# Patient Record
Sex: MALE | Race: WHITE | NOT HISPANIC OR LATINO | URBAN - METROPOLITAN AREA
[De-identification: names, ages, dates, MRNs, and addresses within clinical notes are randomized per-mention and may not be internally consistent; named-entity substitution may affect disease eponyms.]

---

## 2019-04-14 ENCOUNTER — INPATIENT (INPATIENT)
Facility: HOSPITAL | Age: 39
LOS: 0 days | Discharge: HOME | DRG: 312 | End: 2019-04-15
Attending: STUDENT IN AN ORGANIZED HEALTH CARE EDUCATION/TRAINING PROGRAM | Admitting: STUDENT IN AN ORGANIZED HEALTH CARE EDUCATION/TRAINING PROGRAM
Payer: COMMERCIAL

## 2019-04-14 VITALS
RESPIRATION RATE: 18 BRPM | OXYGEN SATURATION: 98 % | WEIGHT: 227.08 LBS | HEIGHT: 71 IN | SYSTOLIC BLOOD PRESSURE: 126 MMHG | TEMPERATURE: 98 F | DIASTOLIC BLOOD PRESSURE: 82 MMHG | HEART RATE: 77 BPM

## 2019-04-14 DIAGNOSIS — Z98.890 OTHER SPECIFIED POSTPROCEDURAL STATES: Chronic | ICD-10-CM

## 2019-04-14 LAB
BASOPHILS # BLD AUTO: 0 K/UL — SIGNIFICANT CHANGE UP (ref 0–0.2)
BASOPHILS NFR BLD AUTO: 0.4 % — SIGNIFICANT CHANGE UP (ref 0–2)
EOSINOPHIL # BLD AUTO: 0.2 K/UL — SIGNIFICANT CHANGE UP (ref 0–0.5)
EOSINOPHIL NFR BLD AUTO: 2.1 % — SIGNIFICANT CHANGE UP (ref 0–6)
HCT VFR BLD CALC: 40.2 % — SIGNIFICANT CHANGE UP (ref 39–50)
HGB BLD-MCNC: 13.2 G/DL — SIGNIFICANT CHANGE UP (ref 13–17)
LYMPHOCYTES # BLD AUTO: 2.2 K/UL — SIGNIFICANT CHANGE UP (ref 1–3.3)
LYMPHOCYTES # BLD AUTO: 24.5 % — SIGNIFICANT CHANGE UP (ref 13–44)
MCHC RBC-ENTMCNC: 30.7 PG — SIGNIFICANT CHANGE UP (ref 27–34)
MCHC RBC-ENTMCNC: 32.7 GM/DL — SIGNIFICANT CHANGE UP (ref 32–36)
MCV RBC AUTO: 93.8 FL — SIGNIFICANT CHANGE UP (ref 80–100)
MONOCYTES # BLD AUTO: 1.1 K/UL — HIGH (ref 0–0.9)
MONOCYTES NFR BLD AUTO: 12.4 % — SIGNIFICANT CHANGE UP (ref 2–14)
NEUTROPHILS # BLD AUTO: 5.6 K/UL — SIGNIFICANT CHANGE UP (ref 1.8–7.4)
NEUTROPHILS NFR BLD AUTO: 60.7 % — SIGNIFICANT CHANGE UP (ref 43–77)
PLATELET # BLD AUTO: 223 K/UL — SIGNIFICANT CHANGE UP (ref 150–400)
RBC # BLD: 4.28 M/UL — SIGNIFICANT CHANGE UP (ref 4.2–5.8)
RBC # FLD: 11.9 % — SIGNIFICANT CHANGE UP (ref 10.3–14.5)
WBC # BLD: 9.2 K/UL — SIGNIFICANT CHANGE UP (ref 3.8–10.5)
WBC # FLD AUTO: 9.2 K/UL — SIGNIFICANT CHANGE UP (ref 3.8–10.5)

## 2019-04-14 PROCEDURE — 71046 X-RAY EXAM CHEST 2 VIEWS: CPT | Mod: 26

## 2019-04-14 PROCEDURE — 93010 ELECTROCARDIOGRAM REPORT: CPT | Mod: NC

## 2019-04-14 PROCEDURE — 99285 EMERGENCY DEPT VISIT HI MDM: CPT | Mod: 25

## 2019-04-14 RX ORDER — ASPIRIN/CALCIUM CARB/MAGNESIUM 324 MG
325 TABLET ORAL ONCE
Qty: 0 | Refills: 0 | Status: COMPLETED | OUTPATIENT
Start: 2019-04-14 | End: 2019-04-14

## 2019-04-14 RX ADMIN — Medication 325 MILLIGRAM(S): at 23:38

## 2019-04-14 NOTE — ED PROVIDER NOTE - PROGRESS NOTE DETAILS
eJsi Carbone, resident MD: EP was consulted for interrogation, initial trop was 7, will obtain a repeat, will admit patient for further cardiac evaluation. discussed admission with pt and agrees to plan Jesi Carbone, resident MD: pt reported that he wanted to move to a different hospital as pt has not been moved to a hospital room yet. paged and spoke with admitting team for evaluation as pt had not yet been evaluated yet. pt has medications that he needs to take in the morning and will prescribe at this time.

## 2019-04-14 NOTE — ED PROVIDER NOTE - OBJECTIVE STATEMENT
37yo man PMH CHF EF 20% s/p AICD placement, HIV, depression presents after an episode of chest pain/palpitations with associated SOB, diaphoresis, and possible AICD shock and syncope 1 hour ago. Pt reports that he has been having a cough and runny nose for the past 2 weeks and attributed it to allergies and took more allergy medication today and felt more lethargic than usual. He felt lightheaded and went to lie in bed when he started to feel palpitations and chest pain with associated SOB and diaphoresis. He felt a sensation run down his arm and is unsure whether it was a shock delivered or not and lost consciousness. He denies falling as he was already lying down. He woke up and decided to come to the ED. He is currently asymptomatic and denies chest pain, SOB, nausea, lightheadedness, headache. He has not had fevers or chills. Most recent echo was 11/2018 and is due for another echo next month. AICD was placed 12/19/2108  Cardiologist: Dr. Gary Valente  CHF specialist: Dr. Dilia Payton  EP: Dr. Warren Ngo

## 2019-04-14 NOTE — ED PROVIDER NOTE - PHYSICAL EXAMINATION
General: well-appearing young man in no acute distress  Head: normocephalic, atraumatic  Eyes: PERRL  Mouth: moist mucous membranes  Neck: supple neck  Chest: normal rate and rhythm, normal S1 and S2, device implanted on left upper chest, no overlying rash  Respiratory: clear to auscultation bilaterally, no crackles  Abdomen: soft, nontender, nondistended  Back: no midline tenderness to palpation, no CVAT  Neuro: alert and oriented x3, speech clear, moves all extremities spontaneously  Skin: no rash  Extremities: mild LE edema bilaterally, peripheral pulses 2+ bilaterally

## 2019-04-14 NOTE — ED ADULT TRIAGE NOTE - BP NONINVASIVE DIASTOLIC (MM HG)
Detail Level: Zone If no improvement, recommend follow up appointment with MD.  Doubt that this is a rash related to his flomax, more likely xerosis+rosa. 83

## 2019-04-14 NOTE — ED PROVIDER NOTE - ATTENDING CONTRIBUTION TO CARE
I performed a history and physical exam of the patient and discussed their management with the resident. I reviewed the resident's note and agree with the documented findings and plan of care, except if noted below. My medical decision making and observations can be found be found below.    39 yo male hx of HIV, CHF with AICD/PPM presents with episode of palpitations, SOB, lightheadedness followed by syncopal episode. Denies complaints at this time. Concerned for arrythmia as cause of syncope. Will check labs, trop, cxr, interrogate PPM and admit.

## 2019-04-14 NOTE — ED PROVIDER NOTE - CLINICAL SUMMARY MEDICAL DECISION MAKING FREE TEXT BOX
37yo man PMH CHF with AICD presents with an episode of syncope after palpitation, sob, and diaphoresis with possible AICD shock. Will check ecg, trop, cxr, electrolytes, and interrogate device. Will need to admit for further cardiac workup with echo and monitoring. will continue to monitor and reassess.

## 2019-04-14 NOTE — ED PROVIDER NOTE - NS ED ROS FT
General: no fevers or chills  Head: +lightheadedness, not currently  Eyes: no vision change  ENT: no neck pain, no nasal discharge   CV: +palpitation, +chest pain, not currently  Resp: +SOB  GI: no N/V/D, no abdominal pain  : no dysuria  MSK: +left arm pain  Skin: no rash  Neuro: no focal weakness, no change in sensation

## 2019-04-15 VITALS
OXYGEN SATURATION: 99 % | DIASTOLIC BLOOD PRESSURE: 46 MMHG | HEART RATE: 69 BPM | RESPIRATION RATE: 16 BRPM | TEMPERATURE: 98 F | SYSTOLIC BLOOD PRESSURE: 105 MMHG

## 2019-04-15 DIAGNOSIS — G47.33 OBSTRUCTIVE SLEEP APNEA (ADULT) (PEDIATRIC): ICD-10-CM

## 2019-04-15 DIAGNOSIS — R00.2 PALPITATIONS: ICD-10-CM

## 2019-04-15 DIAGNOSIS — R07.9 CHEST PAIN, UNSPECIFIED: ICD-10-CM

## 2019-04-15 DIAGNOSIS — Z95.810 PRESENCE OF AUTOMATIC (IMPLANTABLE) CARDIAC DEFIBRILLATOR: Chronic | ICD-10-CM

## 2019-04-15 DIAGNOSIS — B20 HUMAN IMMUNODEFICIENCY VIRUS [HIV] DISEASE: ICD-10-CM

## 2019-04-15 DIAGNOSIS — I42.0 DILATED CARDIOMYOPATHY: ICD-10-CM

## 2019-04-15 LAB
ALBUMIN SERPL ELPH-MCNC: 4.1 G/DL — SIGNIFICANT CHANGE UP (ref 3.3–5)
ALP SERPL-CCNC: 57 U/L — SIGNIFICANT CHANGE UP (ref 40–120)
ALT FLD-CCNC: 18 U/L — SIGNIFICANT CHANGE UP (ref 10–45)
ANION GAP SERPL CALC-SCNC: 13 MMOL/L — SIGNIFICANT CHANGE UP (ref 5–17)
AST SERPL-CCNC: 20 U/L — SIGNIFICANT CHANGE UP (ref 10–40)
BILIRUB SERPL-MCNC: 0.3 MG/DL — SIGNIFICANT CHANGE UP (ref 0.2–1.2)
BUN SERPL-MCNC: 21 MG/DL — SIGNIFICANT CHANGE UP (ref 7–23)
CALCIUM SERPL-MCNC: 9.2 MG/DL — SIGNIFICANT CHANGE UP (ref 8.4–10.5)
CHLORIDE SERPL-SCNC: 103 MMOL/L — SIGNIFICANT CHANGE UP (ref 96–108)
CO2 SERPL-SCNC: 29 MMOL/L — SIGNIFICANT CHANGE UP (ref 22–31)
CREAT SERPL-MCNC: 1.12 MG/DL — SIGNIFICANT CHANGE UP (ref 0.5–1.3)
GLUCOSE SERPL-MCNC: 93 MG/DL — SIGNIFICANT CHANGE UP (ref 70–99)
MAGNESIUM SERPL-MCNC: 2.3 MG/DL — SIGNIFICANT CHANGE UP (ref 1.6–2.6)
NT-PROBNP SERPL-SCNC: 58 PG/ML — SIGNIFICANT CHANGE UP (ref 0–300)
PHOSPHATE SERPL-MCNC: 2.4 MG/DL — LOW (ref 2.5–4.5)
POTASSIUM SERPL-MCNC: 3.9 MMOL/L — SIGNIFICANT CHANGE UP (ref 3.5–5.3)
POTASSIUM SERPL-SCNC: 3.9 MMOL/L — SIGNIFICANT CHANGE UP (ref 3.5–5.3)
PROT SERPL-MCNC: 7.3 G/DL — SIGNIFICANT CHANGE UP (ref 6–8.3)
SODIUM SERPL-SCNC: 145 MMOL/L — SIGNIFICANT CHANGE UP (ref 135–145)
TROPONIN T, HIGH SENSITIVITY RESULT: 7 NG/L — SIGNIFICANT CHANGE UP (ref 0–51)
TROPONIN T, HIGH SENSITIVITY RESULT: <6 NG/L — SIGNIFICANT CHANGE UP (ref 0–51)

## 2019-04-15 PROCEDURE — 93306 TTE W/DOPPLER COMPLETE: CPT | Mod: 26

## 2019-04-15 PROCEDURE — 83735 ASSAY OF MAGNESIUM: CPT

## 2019-04-15 PROCEDURE — 83880 ASSAY OF NATRIURETIC PEPTIDE: CPT

## 2019-04-15 PROCEDURE — 85027 COMPLETE CBC AUTOMATED: CPT

## 2019-04-15 PROCEDURE — 84484 ASSAY OF TROPONIN QUANT: CPT

## 2019-04-15 PROCEDURE — 99285 EMERGENCY DEPT VISIT HI MDM: CPT

## 2019-04-15 PROCEDURE — 71046 X-RAY EXAM CHEST 2 VIEWS: CPT

## 2019-04-15 PROCEDURE — 84100 ASSAY OF PHOSPHORUS: CPT

## 2019-04-15 PROCEDURE — 99223 1ST HOSP IP/OBS HIGH 75: CPT

## 2019-04-15 PROCEDURE — 80053 COMPREHEN METABOLIC PANEL: CPT

## 2019-04-15 PROCEDURE — 93005 ELECTROCARDIOGRAM TRACING: CPT

## 2019-04-15 PROCEDURE — C8929: CPT

## 2019-04-15 RX ORDER — CARVEDILOL PHOSPHATE 80 MG/1
25 CAPSULE, EXTENDED RELEASE ORAL ONCE
Qty: 0 | Refills: 0 | Status: COMPLETED | OUTPATIENT
Start: 2019-04-15 | End: 2019-04-15

## 2019-04-15 RX ORDER — SPIRONOLACTONE 25 MG/1
25 TABLET, FILM COATED ORAL DAILY
Qty: 0 | Refills: 0 | Status: DISCONTINUED | OUTPATIENT
Start: 2019-04-15 | End: 2019-04-15

## 2019-04-15 RX ORDER — SACUBITRIL AND VALSARTAN 24; 26 MG/1; MG/1
1 TABLET, FILM COATED ORAL
Qty: 0 | Refills: 0 | Status: DISCONTINUED | OUTPATIENT
Start: 2019-04-15 | End: 2019-04-15

## 2019-04-15 RX ORDER — CARVEDILOL PHOSPHATE 80 MG/1
25 CAPSULE, EXTENDED RELEASE ORAL EVERY 12 HOURS
Qty: 0 | Refills: 0 | Status: DISCONTINUED | OUTPATIENT
Start: 2019-04-15 | End: 2019-04-15

## 2019-04-15 RX ORDER — CARVEDILOL PHOSPHATE 80 MG/1
1 CAPSULE, EXTENDED RELEASE ORAL
Qty: 0 | Refills: 0 | COMMUNITY

## 2019-04-15 RX ORDER — ESZOPICLONE 2 MG/1
1 TABLET, COATED ORAL
Qty: 0 | Refills: 0 | COMMUNITY

## 2019-04-15 RX ORDER — ACETAMINOPHEN 500 MG
650 TABLET ORAL EVERY 6 HOURS
Qty: 0 | Refills: 0 | Status: DISCONTINUED | OUTPATIENT
Start: 2019-04-15 | End: 2019-04-15

## 2019-04-15 RX ORDER — SACUBITRIL AND VALSARTAN 24; 26 MG/1; MG/1
1 TABLET, FILM COATED ORAL
Qty: 0 | Refills: 0 | COMMUNITY

## 2019-04-15 RX ORDER — SPIRONOLACTONE 25 MG/1
1 TABLET, FILM COATED ORAL
Qty: 0 | Refills: 0 | COMMUNITY

## 2019-04-15 RX ORDER — SACUBITRIL AND VALSARTAN 24; 26 MG/1; MG/1
1 TABLET, FILM COATED ORAL ONCE
Qty: 0 | Refills: 0 | Status: COMPLETED | OUTPATIENT
Start: 2019-04-15 | End: 2019-04-15

## 2019-04-15 RX ORDER — BICTEGRAVIR SODIUM, EMTRICITABINE, AND TENOFOVIR ALAFENAMIDE FUMARATE 30; 120; 15 MG/1; MG/1; MG/1
1 TABLET ORAL DAILY
Qty: 0 | Refills: 0 | Status: DISCONTINUED | OUTPATIENT
Start: 2019-04-15 | End: 2019-04-15

## 2019-04-15 RX ORDER — BICTEGRAVIR SODIUM, EMTRICITABINE, AND TENOFOVIR ALAFENAMIDE FUMARATE 30; 120; 15 MG/1; MG/1; MG/1
1 TABLET ORAL
Qty: 0 | Refills: 0 | COMMUNITY

## 2019-04-15 RX ORDER — BICTEGRAVIR SODIUM, EMTRICITABINE, AND TENOFOVIR ALAFENAMIDE FUMARATE 30; 120; 15 MG/1; MG/1; MG/1
1 TABLET ORAL ONCE
Qty: 0 | Refills: 0 | Status: COMPLETED | OUTPATIENT
Start: 2019-04-15 | End: 2019-04-15

## 2019-04-15 RX ADMIN — CARVEDILOL PHOSPHATE 25 MILLIGRAM(S): 80 CAPSULE, EXTENDED RELEASE ORAL at 06:36

## 2019-04-15 RX ADMIN — BICTEGRAVIR SODIUM, EMTRICITABINE, AND TENOFOVIR ALAFENAMIDE FUMARATE 1 TABLET(S): 30; 120; 15 TABLET ORAL at 06:36

## 2019-04-15 RX ADMIN — Medication 40 MILLIGRAM(S): at 06:35

## 2019-04-15 RX ADMIN — SACUBITRIL AND VALSARTAN 1 TABLET(S): 24; 26 TABLET, FILM COATED ORAL at 06:36

## 2019-04-15 NOTE — H&P ADULT - PROBLEM SELECTOR PLAN 1
EP interrogation of AICD.   Card eval: Dr Valente aware.   Monitor on tele.  Can consider SE from benadryl/zyrtec given introduction of new med in combination to alcohol and dehydration.

## 2019-04-15 NOTE — ED ADULT NURSE NOTE - NSIMPLEMENTINTERV_GEN_ALL_ED
Implemented All Universal Safety Interventions:  [place] to call system. Call bell, personal items and telephone within reach. Instruct patient to call for assistance. Room bathroom lighting operational. Non-slip footwear when patient is off stretcher. Physically safe environment: no spills, clutter or unnecessary equipment. Stretcher in lowest position, wheels locked, appropriate side rails in place.

## 2019-04-15 NOTE — DISCHARGE NOTE PROVIDER - NSDCCPCAREPLAN_GEN_ALL_CORE_FT
PRINCIPAL DISCHARGE DIAGNOSIS  Diagnosis: Syncope  Assessment and Plan of Treatment: you will be free of symptoms  follow up with your cardiologist, Gary Villavicencio within 1-2 weeks

## 2019-04-15 NOTE — H&P ADULT - HISTORY OF PRESENT ILLNESS
39 yo m with h/o NICM (EF 15-20%) diagnosed 8/2018 in [place], s/p AICD 12/2018 in [place], HIV, RUPA on CPAP presented with palpitations and brief episode of altered MS last night. Patient had been staying out late in the city last 2 days took some benadryl night before slept all day and woke up late last night at around 9 PM. Patient has also been suffering from seasonal allergies and took some Zyrtec as well. Patient woke up to eat something but while talking to a family member felt "distant" and foggy. Some time later felt like his heart was "racing" and had similar sensation as when he gets his device interrogated. Denies any sudden shock or chest pain sensation. Unsure if he felt like his device had gone off. Has had no previous episodes. No additional episodes since being in the hospital. Has been feeling well otherwise, exercises during the week able to run 6 miles and has lost around 16 lbs intentionally.     He was diagnosed with severe dilated CM back in Aug 2018 when he gained tremendous amount of weight and MACKEY. Unclear reason was found but was told if no improvement of hear function may need heart transplant. He follow up with his HIV specialist regularly back in [place].     Current VS: temp 36.7, 104/57, 70, 16, 100 RA

## 2019-04-15 NOTE — H&P ADULT - NSICDXPASTMEDICALHX_GEN_ALL_CORE_FT
PAST MEDICAL HISTORY:  CHF (congestive heart failure)     HIV (human immunodeficiency virus infection)     RUPA on CPAP

## 2019-04-15 NOTE — H&P ADULT - NSHPSOCIALHISTORY_GEN_ALL_CORE
Lives with family.  Current smoker. used to smoke heavily for 15 years now social smoking when going out.   denies any illicit drug use.

## 2019-04-15 NOTE — ED ADULT NURSE REASSESSMENT NOTE - NS ED NURSE REASSESS COMMENT FT1
Patient resting comfortably; VSS, sinus rhythm remains on the monitor. Patient admitted and waiting for bed assignment.

## 2019-04-15 NOTE — DISCHARGE NOTE NURSING/CASE MANAGEMENT/SOCIAL WORK - NSDCPEPT PROEDHF_GEN_ALL_CORE
Call primary care provider for follow up after discharge/Monitor weight daily/Activities as tolerated/Low salt diet/Report signs and symptoms to primary care provider

## 2019-04-15 NOTE — ED ADULT NURSE REASSESSMENT NOTE - NS ED NURSE REASSESS COMMENT FT1
Patient resting comfortably; denies chest pain or SOB at this time- VSS, 18 g IV in L AC patent and site WNL. Sinus rhythm remains on the monitor, patient admitted and waiting for bed assignment.

## 2019-04-15 NOTE — DISCHARGE NOTE PROVIDER - HOSPITAL COURSE
HPI:    37 yo m with h/o NICM (EF 15-20%) diagnosed 8/2018 in [place], s/p AICD 12/2018 in [place], diagnosed with severe dilated CM back in Aug 2018, HIV, RUPA on CPAP presented with palpitations and brief episode of altered MS last night. Patient had been staying out late in the city last 2 days took some benadryl night before slept all day and woke up late last night at around 9 PM. Patient has also been suffering from seasonal allergies and took some Zyrtec as well. Patient woke up to eat something but while talking to a family member felt "distant" and foggy. Some time later felt like his heart was "racing" and had similar sensation as when he gets his device interrogated. Denies any sudden shock or chest pain sensation. Unsure if he felt like his device had gone off. Has had no previous episodes. No additional episodes since being in the hospital. Has been feeling well otherwise, exercises during the week able to run 6 miles and has lost around 16 lbs intentionally.         follow up with his HIV specialist regularly back in [place].         EP to interrogate ICD:    1. Normal ICD function with pacing/sensing thresholds and impedances    2. Patient is not pacemaker dependent    3. Battery longevity is 10.5years    4. No VT/VF episodes. No ICD therapies delivered. No events on interrogation        Pt. to have an echo as outpt in office or at [place] where he has been followed. Patient stable for discharge planning today, case discussed with Dr. Coleman and patient's cardiologist, Gary Villavicencio. No events found upon interrogation, no evidecne of arrthymia

## 2019-04-15 NOTE — ED ADULT NURSE NOTE - OBJECTIVE STATEMENT
38 y.o M presents to the ED from home c/o chest discomfort and palpitations. Patient is awake, alert and speaking coherently. As per patient " I have a history of heart failure and I have an AICD and I think it fired a few times today; I felt like I was having heart palpitations." Patient presents A&Ox3, afebrile and ambulatory; denies headache and dizziness, denies numbness and tingling; endorses chest discomfort but denies SOB at this time, abdomen soft, nontender and nondistended, denies n/v/d. Cardiac monitoring initiated- sinus rhythm on the monitor- no pacing.

## 2019-04-15 NOTE — DISCHARGE NOTE PROVIDER - CARE PROVIDER_API CALL
Gary Valente)  Cardiology; Internal Medicine  [address] [place] [address], [address]  [place] [address]  Phone: [phone]  Fax: [phone]  Follow Up Time:

## 2019-04-15 NOTE — DISCHARGE NOTE NURSING/CASE MANAGEMENT/SOCIAL WORK - NSDCDPATPORTLINK_GEN_ALL_CORE
You can access the [facility] Patient Portal, offered by [facility], by registering with the following website: [email or web address]

## 2019-04-15 NOTE — H&P ADULT - PROBLEM SELECTOR PLAN 4
Has been intermittently compliant with CPAP at home  Can bring home equipement if not discharged in 24-48hrs.

## 2019-04-15 NOTE — DISCHARGE NOTE PROVIDER - NSDCCPTREATMENT_GEN_ALL_CORE_FT
PRINCIPAL PROCEDURE  Procedure: Interrogation, ICD  Findings and Treatment: no events on review of device

## 2019-04-15 NOTE — PROCEDURE NOTE - ADDITIONAL PROCEDURE DETAILS
1. Normal ICD function with pacing/sensing thresholds and impedances  2. Patient is not pacemaker dependent  3. Battery longevity is 10.5years  4. No VT/VF episodes. No ICD therapies delivered. No events on interrogation

## 2019-04-15 NOTE — CONSULT NOTE ADULT - SUBJECTIVE AND OBJECTIVE BOX
Patient is a 38y old  Male who presents with a chief complaint of palpitations (15 Apr 2019 09:09)      HPI: 37 yo M who presented to the [facility] ER after an episode of lightheadedness followed by LOC for 10-15 seconds while at home. Prior to the   LOC he felt palpitations- like his heart going fast and slow. He also felt like his ICD went off and was shocked. He has been suffering from allergies over the   last few weeks and took a antihistamine with a decongestant the night before. He has been compliant with his medications and diet. No recent SOB, CP or dizzy  spells. In the ER, his BP was stable and initial ECG was unremarkable. Troponin was negative and his electrolytes were normal.      PAST MEDICAL & SURGICAL HISTORY:  Dilated nonischemic CM  HFrEF  Depression  HIV (human immunodeficiency virus infection)  CHF (congestive heart failure)  H/O hernia repair      MEDICATIONS  (STANDING):  bictegravir 50 mG/emtricitabine 200 mG/tenofovir alafenamide 25 mG (BIKTARVY) 1 Tablet(s) Oral daily  carvedilol 25 milliGRAM(s) Oral every 12 hours  sacubitril 97 mG/valsartan 103 mG 1 Tablet(s) Oral two times a day  spironolactone 25 milliGRAM(s) Oral daily  torsemide 40 milliGRAM(s) Oral two times a day    MEDICATIONS  (PRN):  acetaminophen   Tablet .. 650 milliGRAM(s) Oral every 6 hours PRN Temp greater or equal to 38C (100.4F), Mild Pain (1 - 3)    ALLERGIES: NKDA    FAMILY HISTORY: Mother living- no known heart history    SOCIAL HISTORY: single; law student; social alcohol use    CIGARETTES: no tobacco use    REVIEW OF SYSTEMS  General: negative  Respiratory and Thorax: no SOB	  Cardiovascular:	see HPI  Gastrointestinal:	negative  Neurological: negative  	  Vital Signs Last 24 Hrs  T(C): 36.6 (15 Apr 2019 08:12), Max: 36.7 (15 Apr 2019 02:49)  T(F): 97.9 (15 Apr 2019 08:12), Max: 98 (15 Apr 2019 02:49)  HR: 67 (15 Apr 2019 08:12) (67 - 77)  BP: 105/47 (15 Apr 2019 08:12) (105/47 - 127/84)  BP(mean): --  RR: 16 (15 Apr 2019 08:12) (15 - 18)  SpO2: 98% (15 Apr 2019 08:12) (98% - 100%)  PHYSICAL EXAM:      Constitutional: WD, WN in NAD  Neck: no JVD  Respiratory: clear lungs  Cardiovascular: RRR with 1/6 LAURIE at base  Gastrointestinal: BS present, NT, ND, no HSM or masses  Extremities: no edema   Neurological: grossly nonfocal  Psychiatric: nl affect and disposition    TELEMETRY: RSR    ECG:< from: 12 Lead ECG (04.14.19 @ 22:27) >  Ventricular Rate 66 BPM    Atrial Rate 66 BPM    P-R Interval 176 ms    QRS Duration 98 ms    Q-T Interval 410 ms    QTC Calculation(Bezet) 429 ms    P Axis 49 degrees    R Axis 21 degrees    T Axis -39 degrees    Diagnosis Line NORMAL SINUS RHYTHM  NONSPECIFIC T WAVE ABNORMALITY  ABNORMAL ECG    Confirmed by ATTENDING, ED ([id number]),  DIEGO URENA ([id number]) on 4/15/2019 6:10:36 AM    < end of copied text >      LABS:                        13.2   9.2   )-----------( 223      ( 14 Apr 2019 23:34 )             40.2     04-14    145  |  103  |  21  ----------------------------<  93  3.9   |  29  |  1.12    Ca    9.2      14 Apr 2019 23:34  Phos  2.4     04-14  Mg     2.3     04-14    TPro  7.3  /  Alb  4.1  /  TBili  0.3  /  DBili  x   /  AST  20  /  ALT  18  /  AlkPhos  57  04-14    Troponin T- <6, 7    I&O's Summary    BNPSerum Pro-Brain Natriuretic Peptide: 58 pg/mL (04-14 @ 23:34)    RADIOLOGY & ADDITIONAL STUDIES:  < from: Xray Chest 2 Views PA/Lat (04.14.19 @ 23:40) >  EXAM:  XR CHEST PA LAT 2V                            PROCEDURE DATE:  04/14/2019            INTERPRETATION:  CLINICAL INDICATION: Chest pain.    EXAM: Frontal and lateral views of the chest with no prior radiographs.    FINDINGS:   Left-sided AICD.  The lungs are clear. There is no pleural effusion or pneumothorax.  The heart size is normal.   No acute bony pathology.    IMPRESSION:   Clear lungs.                DARRYL MILAN M.D., RADIOLOGY RESIDENT  This document has been electronically signed.  JOSEPHINE GONZALEZ M.D., ATTENDING RADIOLOGIST  This document has been electronically signed. Apr 15 2019  8:06AM    < end of copied text >    IMPRESSION:  Possible ICD discharge in pt with a DCM and HFrEF  Depression  HIV    RECOMMENDATIONS:  Continue current meds  EP to interrogate ICD and see what happened last PM  Further treatment based on findings after interrogation of ICD  Pt. to have an echo as outpt in office or at [place] where he has been followed

## 2019-04-15 NOTE — DISCHARGE NOTE PROVIDER - NSDCFUADDINST_GEN_ALL_CORE_FT
follow up with your cardiologist within 1-2 weeks    DC use of decongestants for 1 week. Use as instructed on bottle, one at a time, never use two decongestants at the same time. Keep hydrated, drink atleast 1-2 L water daily. eat 2 meals and snacks in between meals

## 2019-04-15 NOTE — H&P ADULT - ASSESSMENT
37 yo m with h/o NICM (EF 15-20%) diagnosed 8/2018 in [place], s/p AICD 12/2018 in [place], HIV, RUPA on CPAP presented with palpitations and brief episode of altered MS

## 2020-07-05 ENCOUNTER — EMERGENCY (EMERGENCY)
Facility: HOSPITAL | Age: 40
LOS: 1 days | Discharge: HOME | End: 2020-07-05
Attending: STUDENT IN AN ORGANIZED HEALTH CARE EDUCATION/TRAINING PROGRAM
Payer: COMMERCIAL

## 2020-07-05 VITALS
SYSTOLIC BLOOD PRESSURE: 108 MMHG | RESPIRATION RATE: 14 BRPM | DIASTOLIC BLOOD PRESSURE: 68 MMHG | TEMPERATURE: 98 F | OXYGEN SATURATION: 98 % | HEART RATE: 76 BPM

## 2020-07-05 VITALS
SYSTOLIC BLOOD PRESSURE: 109 MMHG | RESPIRATION RATE: 18 BRPM | HEIGHT: 71 IN | HEART RATE: 85 BPM | OXYGEN SATURATION: 98 % | TEMPERATURE: 98 F | DIASTOLIC BLOOD PRESSURE: 74 MMHG | WEIGHT: 238.1 LBS

## 2020-07-05 DIAGNOSIS — Z98.890 OTHER SPECIFIED POSTPROCEDURAL STATES: Chronic | ICD-10-CM

## 2020-07-05 DIAGNOSIS — Z95.810 PRESENCE OF AUTOMATIC (IMPLANTABLE) CARDIAC DEFIBRILLATOR: Chronic | ICD-10-CM

## 2020-07-05 PROCEDURE — 76882 US LMTD JT/FCL EVL NVASC XTR: CPT

## 2020-07-05 PROCEDURE — 99284 EMERGENCY DEPT VISIT MOD MDM: CPT

## 2020-07-05 PROCEDURE — 76882 US LMTD JT/FCL EVL NVASC XTR: CPT | Mod: 26

## 2020-07-05 NOTE — ED ADULT NURSE NOTE - NSIMPLEMENTINTERV_GEN_ALL_ED
Implemented All Universal Safety Interventions:  [place] to call system. Call bell, personal items and telephone within reach. Instruct patient to call for assistance. Room bathroom lighting operational. Non-slip footwear when patient is off stretcher. Physically safe environment: no spills, clutter or unnecessary equipment. Stretcher in lowest position, wheels locked, appropriate side rails in place.

## 2020-07-05 NOTE — ED PROVIDER NOTE - PHYSICAL EXAMINATION
A&Ox3, NAD. NCAT. PERRL, EOMI. Neck supple, no LAD. Lungs CTAB. +S1S2, RRR, No m/r/g. Abd soft, NT/ND, +BS, no rebound or guarding. Extremities: cap refill <2, pulses in distal extremities 4+, no edema. Skin: right medial aspect of distal forearm, with 2x1cm area of induration no noticeable fluctuance, no drainage/discharge. + mild erythema overlaying area of induration, no tracking up the arm, area nontender to touch. CN II-XII intact. Strength 5/5 UE/LE. Sensations intact throughout. Gait steady.

## 2020-07-05 NOTE — ED ADULT NURSE NOTE - ISOLATION TYPE:
No Heavy lifting/straining/Do not make important decisions/Bathing allowed/Stairs allowed/Do not drive or operate machinery/Showering allowed/Walking-Indoors allowed/Walking-Outdoors allowed None

## 2020-07-05 NOTE — ED PROVIDER NOTE - NSFOLLOWUPINSTRUCTIONS_ED_ALL_ED_FT
- stay hydrated.   -take _________ as prescribed  - follow up with your pcp in 1-2 days.  - return if symptoms worsen, fevers/chills, worsening swelling, redness, swelling and all other concerns. Thank you for visiting our Emergency Department, it has been a pleasure taking part in your healthcare.    Your discharge diagnosis is: abscess  Please take all discharge medications as indicated below:  Take Motrin/Tylenol for pain as needed, please follow instructions on manufacturers label. If you have any questions please consult a pharmacist or your PMD.  Cefadroxil 500mg twice a day x 5 days  Please follow up with your PMD within x48 hours.  A copy of resulted labs, imaging, and findings have been provided to you.   You have had a detailed discussion with your provider regarding your diagnosis, care management and discharge planning including, but not limited to: return precautions, follow up visits with existing or new providers, new prescriptions and/or medication changes, wound and/or splint/cast care or other care   aspects specific to your diagnosis and treatment. You have been given the opportunity to have your questions answered. At this time you have been deemed stable and fit for discharge.  Return precautions to the Emergency Department include but are not limited to: unrelenting nausea, vomiting, fever, chills, chest pain, shortness of breath, dizziness, chest or abdominal pain, worsening back pain, syncope, blood in urine or stool, headache that doesn't resolve, numbness or tingling, loss of sensation, loss of motor function, or any other concerning symptoms.

## 2020-07-05 NOTE — ED PROCEDURE NOTE - PROCEDURE ADDITIONAL DETAILS
Unable to express purulent discharge after attempt of needle aspiration.
Trace hypoechoic collection without posterior acoustic enhancement  POCUS: Emergency Department Focused Ultrasound performed at patient's bedside.  The complete report may be available in PACS, see below for findings.

## 2020-07-05 NOTE — ED PROVIDER NOTE - PATIENT PORTAL LINK FT
You can access the FollowMyHealth Patient Portal offered by [facility] by registering at the following website: [email or web address]. By joining [employer]’s FollowMyHealth portal, you will also be able to view your health information using other applications (apps) compatible with our system.

## 2020-07-05 NOTE — ED PROVIDER NOTE - OBJECTIVE STATEMENT
40 yo male HIV + (undetectable viral load, CD4 count 1600 1 month ago), heart failure (EF currently 65%) presenting for concern of abscess x 1 week. Pt states he started developing abscess of right forearm, started abx from telemedicine (unsure of name) which he finished today, however abscess has not completely resolved and was told by telemedicine to come to ED/urgent care for it to be drained. Pt states abscess has improved since onset but is concerned as it has not gone completely away. Endorses previous similar abscesses including MRSA years ago, denies fevers, chills, tracking up the arm, no injury, no numbness, tingling, paresthesias.

## 2020-07-05 NOTE — ED ADULT NURSE NOTE - CHPI ED NUR SYMPTOMS NEG
no vomiting/no fever/no blood in mucus/no bleeding at site/no redness/no pain/no rectal pain/no chills

## 2020-07-05 NOTE — ED PROVIDER NOTE - ATTENDING CONTRIBUTION TO CARE
I have personally performed a face to face medical and diagnostic evaluation of the patient. I have discussed with and reviewed the ACP's note and agree with the History, ROS, Physical Exam and MDM unless otherwise indicated. A brief summary of my personal evaluation and impression can be found below.    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 No systemic sx, was already on Cefadroxil 500 BID per PMD. NO systemic sx. Denies n/v/f/c/cp/sob. Denies headache, syncope, lightheadedness, dizziness. Denies chest palpitations, abdominal pain. Denies edema. Denies dysuria, hematuria, BRBPR, tarry stools, diarrhea, constipation.     GEN APPEARANCE: WDWN, alert, non-toxic, NAD  HEAD: Atraumatic.  EYES: PERRLa, EOMI, vision grossly intact.   NECK: Supple  CV: RRR, S1S2, no c/r/m/g. Cap refill <2 seconds. No bruits.   LUNGS: CTAB. No abnormal breath sounds.  ABDOMEN: Soft, NTND. No guarding or rebound.   MSK/EXT: No spinal or extremity point tenderness. No CVA ttp. Pelvis stable. No peripheral edema. RUE small <1 cm area of erythema, non tender, raised, small scab, no fluctuance or discharge present.   NEURO: Alert, follows commands. Weight bearing normal. Speech normal. Sensation and motor normal x4 extremities.   SKIN: Warm, dry and intact. No rash.  PSYCH: Appropriate    Plan/MDM: c/f abscess low c/f systemic infection, nec fasc, severe cellulitis given normal vitals, pt not endorsing significant pain, no creeping erythema, will get US to eval for abscess, offer I&D pending results, continue abx, likely dc.

## 2020-07-05 NOTE — ED ADULT NURSE NOTE - OBJECTIVE STATEMENT
patient is a 40 y/o m who presents to the ED with an abscess on the right arm. abscess is about 1 cm in diameter and warm to the touch. patient states that the abscess started growing about a week ago and started small and grew rapidly. patient states that PCP prescribed an ABX that has had mild effects. patient states that he contacted PCP today and was told to go to either the ER or an urgent care to get it drained. patient states that hes had abscesses like this before that usually are a result of ingrown hairs.   patient is a&ox3, PERRL. strong peripheral pulses. strong extremities x4. patient is ambulatory with steady gait. patient denies CP, SOB, h/a, dizziness, weakness, vision changes, abd pain, fevers, n/v/d, constipation, urinary symptoms, cough, chills, recent travel, or sick contacts. patient has PMH of HIV, RUPA, and CHF. patient has PSH of AICD insertion and hernia repair.

## 2020-07-06 PROBLEM — G47.33 OBSTRUCTIVE SLEEP APNEA (ADULT) (PEDIATRIC): Chronic | Status: ACTIVE | Noted: 2019-04-15

## 2020-07-06 PROBLEM — I50.9 HEART FAILURE, UNSPECIFIED: Chronic | Status: ACTIVE | Noted: 2019-04-14

## 2020-07-06 PROBLEM — B20 HUMAN IMMUNODEFICIENCY VIRUS [HIV] DISEASE: Chronic | Status: ACTIVE | Noted: 2019-04-14

## 2021-08-10 NOTE — H&P ADULT - PROBLEM SELECTOR PLAN 2
[place] [facility]ically without evidence of acute decompensation.   Currently compliant with meds and diet.   Due for repeat echo soon. can check if remains hospitalized longer if not outpt.   Will cont with torsemide, spironolactone, Entresto, Coreg.

## 2022-08-14 ENCOUNTER — EMERGENCY (EMERGENCY)
Facility: HOSPITAL | Age: 42
LOS: 1 days | Discharge: HOME | End: 2022-08-14
Attending: EMERGENCY MEDICINE
Payer: COMMERCIAL

## 2022-08-14 VITALS
OXYGEN SATURATION: 97 % | SYSTOLIC BLOOD PRESSURE: 119 MMHG | DIASTOLIC BLOOD PRESSURE: 76 MMHG | HEART RATE: 78 BPM | RESPIRATION RATE: 18 BRPM | TEMPERATURE: 99 F

## 2022-08-14 VITALS
SYSTOLIC BLOOD PRESSURE: 126 MMHG | TEMPERATURE: 98 F | HEART RATE: 94 BPM | DIASTOLIC BLOOD PRESSURE: 80 MMHG | HEIGHT: 71 IN | WEIGHT: 227.08 LBS | OXYGEN SATURATION: 98 % | RESPIRATION RATE: 20 BRPM

## 2022-08-14 DIAGNOSIS — Z98.890 OTHER SPECIFIED POSTPROCEDURAL STATES: Chronic | ICD-10-CM

## 2022-08-14 DIAGNOSIS — Z95.810 PRESENCE OF AUTOMATIC (IMPLANTABLE) CARDIAC DEFIBRILLATOR: Chronic | ICD-10-CM

## 2022-08-14 LAB
ALBUMIN SERPL ELPH-MCNC: 4 G/DL — SIGNIFICANT CHANGE UP (ref 3.3–5)
ALP SERPL-CCNC: 76 U/L — SIGNIFICANT CHANGE UP (ref 40–120)
ALT FLD-CCNC: 12 U/L — SIGNIFICANT CHANGE UP (ref 10–45)
ANION GAP SERPL CALC-SCNC: 12 MMOL/L — SIGNIFICANT CHANGE UP (ref 5–17)
AST SERPL-CCNC: 13 U/L — SIGNIFICANT CHANGE UP (ref 10–40)
BASOPHILS # BLD AUTO: 0.16 K/UL — SIGNIFICANT CHANGE UP (ref 0–0.2)
BASOPHILS NFR BLD AUTO: 0.9 % — SIGNIFICANT CHANGE UP (ref 0–2)
BILIRUB SERPL-MCNC: 0.3 MG/DL — SIGNIFICANT CHANGE UP (ref 0.2–1.2)
BUN SERPL-MCNC: 8 MG/DL — SIGNIFICANT CHANGE UP (ref 7–23)
CALCIUM SERPL-MCNC: 8.9 MG/DL — SIGNIFICANT CHANGE UP (ref 8.4–10.5)
CHLORIDE SERPL-SCNC: 99 MMOL/L — SIGNIFICANT CHANGE UP (ref 96–108)
CO2 SERPL-SCNC: 29 MMOL/L — SIGNIFICANT CHANGE UP (ref 22–31)
CREAT SERPL-MCNC: 0.99 MG/DL — SIGNIFICANT CHANGE UP (ref 0.5–1.3)
EGFR: 99 ML/MIN/1.73M2 — SIGNIFICANT CHANGE UP
EOSINOPHIL # BLD AUTO: 0.16 K/UL — SIGNIFICANT CHANGE UP (ref 0–0.5)
EOSINOPHIL NFR BLD AUTO: 0.9 % — SIGNIFICANT CHANGE UP (ref 0–6)
GLUCOSE SERPL-MCNC: 91 MG/DL — SIGNIFICANT CHANGE UP (ref 70–99)
HCT VFR BLD CALC: 45.8 % — SIGNIFICANT CHANGE UP (ref 39–50)
HGB BLD-MCNC: 15.1 G/DL — SIGNIFICANT CHANGE UP (ref 13–17)
LYMPHOCYTES # BLD AUTO: 1.49 K/UL — SIGNIFICANT CHANGE UP (ref 1–3.3)
LYMPHOCYTES # BLD AUTO: 8.6 % — LOW (ref 13–44)
MCHC RBC-ENTMCNC: 30.9 PG — SIGNIFICANT CHANGE UP (ref 27–34)
MCHC RBC-ENTMCNC: 33 GM/DL — SIGNIFICANT CHANGE UP (ref 32–36)
MCV RBC AUTO: 93.7 FL — SIGNIFICANT CHANGE UP (ref 80–100)
MONOCYTES # BLD AUTO: 1.78 K/UL — HIGH (ref 0–0.9)
MONOCYTES NFR BLD AUTO: 10.3 % — SIGNIFICANT CHANGE UP (ref 2–14)
NEUTROPHILS # BLD AUTO: 13.44 K/UL — HIGH (ref 1.8–7.4)
NEUTROPHILS NFR BLD AUTO: 77.6 % — HIGH (ref 43–77)
PLATELET # BLD AUTO: 232 K/UL — SIGNIFICANT CHANGE UP (ref 150–400)
POTASSIUM SERPL-MCNC: 3.5 MMOL/L — SIGNIFICANT CHANGE UP (ref 3.5–5.3)
POTASSIUM SERPL-SCNC: 3.5 MMOL/L — SIGNIFICANT CHANGE UP (ref 3.5–5.3)
PROT SERPL-MCNC: 7.3 G/DL — SIGNIFICANT CHANGE UP (ref 6–8.3)
RBC # BLD: 4.89 M/UL — SIGNIFICANT CHANGE UP (ref 4.2–5.8)
RBC # FLD: 13.4 % — SIGNIFICANT CHANGE UP (ref 10.3–14.5)
SODIUM SERPL-SCNC: 140 MMOL/L — SIGNIFICANT CHANGE UP (ref 135–145)
WBC # BLD: 17.32 K/UL — HIGH (ref 3.8–10.5)
WBC # FLD AUTO: 17.32 K/UL — HIGH (ref 3.8–10.5)

## 2022-08-14 PROCEDURE — 87205 SMEAR GRAM STAIN: CPT

## 2022-08-14 PROCEDURE — 87077 CULTURE AEROBIC IDENTIFY: CPT

## 2022-08-14 PROCEDURE — 85025 COMPLETE CBC W/AUTO DIFF WBC: CPT

## 2022-08-14 PROCEDURE — 86355 B CELLS TOTAL COUNT: CPT

## 2022-08-14 PROCEDURE — 72193 CT PELVIS W/DYE: CPT | Mod: 26,MA

## 2022-08-14 PROCEDURE — 36415 COLL VENOUS BLD VENIPUNCTURE: CPT

## 2022-08-14 PROCEDURE — 99285 EMERGENCY DEPT VISIT HI MDM: CPT

## 2022-08-14 PROCEDURE — 96375 TX/PRO/DX INJ NEW DRUG ADDON: CPT | Mod: XU

## 2022-08-14 PROCEDURE — 87186 SC STD MICRODIL/AGAR DIL: CPT

## 2022-08-14 PROCEDURE — 86357 NK CELLS TOTAL COUNT: CPT

## 2022-08-14 PROCEDURE — 87070 CULTURE OTHR SPECIMN AEROBIC: CPT

## 2022-08-14 PROCEDURE — 86360 T CELL ABSOLUTE COUNT/RATIO: CPT

## 2022-08-14 PROCEDURE — 86359 T CELLS TOTAL COUNT: CPT

## 2022-08-14 PROCEDURE — 87536 HIV-1 QUANT&REVRSE TRNSCRPJ: CPT

## 2022-08-14 PROCEDURE — 96374 THER/PROPH/DIAG INJ IV PUSH: CPT | Mod: XU

## 2022-08-14 PROCEDURE — 10061 I&D ABSCESS COMP/MULTIPLE: CPT

## 2022-08-14 PROCEDURE — 80053 COMPREHEN METABOLIC PANEL: CPT

## 2022-08-14 PROCEDURE — 99284 EMERGENCY DEPT VISIT MOD MDM: CPT | Mod: 25

## 2022-08-14 PROCEDURE — 72193 CT PELVIS W/DYE: CPT | Mod: MA

## 2022-08-14 RX ORDER — AMPICILLIN SODIUM AND SULBACTAM SODIUM 250; 125 MG/ML; MG/ML
INJECTION, POWDER, FOR SUSPENSION INTRAMUSCULAR; INTRAVENOUS
Refills: 0 | Status: DISCONTINUED | OUTPATIENT
Start: 2022-08-14 | End: 2022-08-18

## 2022-08-14 RX ORDER — AMPICILLIN SODIUM AND SULBACTAM SODIUM 250; 125 MG/ML; MG/ML
3 INJECTION, POWDER, FOR SUSPENSION INTRAMUSCULAR; INTRAVENOUS ONCE
Refills: 0 | Status: DISCONTINUED | OUTPATIENT
Start: 2022-08-14 | End: 2022-08-18

## 2022-08-14 RX ORDER — AMPICILLIN SODIUM AND SULBACTAM SODIUM 250; 125 MG/ML; MG/ML
3 INJECTION, POWDER, FOR SUSPENSION INTRAMUSCULAR; INTRAVENOUS EVERY 6 HOURS
Refills: 0 | Status: DISCONTINUED | OUTPATIENT
Start: 2022-08-15 | End: 2022-08-18

## 2022-08-14 RX ORDER — KETOROLAC TROMETHAMINE 30 MG/ML
15 SYRINGE (ML) INJECTION ONCE
Refills: 0 | Status: DISCONTINUED | OUTPATIENT
Start: 2022-08-14 | End: 2022-08-14

## 2022-08-14 RX ORDER — ACETAMINOPHEN 500 MG
1000 TABLET ORAL ONCE
Refills: 0 | Status: COMPLETED | OUTPATIENT
Start: 2022-08-14 | End: 2022-08-14

## 2022-08-14 RX ORDER — SODIUM CHLORIDE 9 MG/ML
1000 INJECTION INTRAMUSCULAR; INTRAVENOUS; SUBCUTANEOUS ONCE
Refills: 0 | Status: COMPLETED | OUTPATIENT
Start: 2022-08-14 | End: 2022-08-14

## 2022-08-14 RX ADMIN — Medication 15 MILLIGRAM(S): at 19:39

## 2022-08-14 RX ADMIN — Medication 400 MILLIGRAM(S): at 19:37

## 2022-08-14 RX ADMIN — SODIUM CHLORIDE 1000 MILLILITER(S): 9 INJECTION INTRAMUSCULAR; INTRAVENOUS; SUBCUTANEOUS at 19:37

## 2022-08-14 NOTE — ED PROVIDER NOTE - ATTENDING APP SHARED VISIT CONTRIBUTION OF CARE
See MDM above.  The patient was serially evaluated throughout emergency department course by the team. There was no acute deterioration up to this time in the emergency department. The patient has demonstrated clinical improvement and/or stability, feels better at this time according to emergency department team. Agree with goals/plan of emergency department care as described in this physician's electronic medical record, including diagnostics, therapeutics and consultation recommendation as clinically warranted. Will discharge home with close outpatient follow up with primary care physician/provider and specialist if necessary. The patient and/or family was educated on expectant management and return precautions concerning signs and features to return to the emergency department, in layman terms, including but not limited to: nausea, vomiting, fever, chills, the inability to eat, take medications, or drink, persistent/worsening symptoms or any concerns at all. There are no acute or immediate life threatening issues present on history, clinical exam, or any diagnostic evaluation. The patient is a safe disposition home, has capacity and insight into their condition, is ambulatory in the Emergency Department with no further questions and will follow up with their doctor(s) this week. Diagnosis, prognosis, natural history and treatment was discussed with patient and/or family. The patient and/or family were given the opportunity to ask questions and have them answered in full. The patient and/or family are with capacity and insight into the situation, treatment, risks, benefits, alternative therapies, and understand that they can ask any further questions if needed. Patient and/or family/guardian understands anticipatory guidance and was given strict return and follow up precautions. The patient and/or family/guardian has been informed of the necessity to follow up with the PMD/Clinic/follow up as provided within 2-3 days, and the patient and/or family/guardian reports understanding of above with capacity and insight. The patient and/or family/guardian were informed of any results of their tests and are were encouraged to follow up on the findings with their doctor as well as the need to inform their doctor of any results. The patient and/or family/guardian are aware of the need to follow up with repeat testing as applicable and report understanding of the above with capacity and insight. The patient and/or family/guardian was made aware of any pending test results at the time of discharge and of the need to call back for the final results as well as the need to inform their doctor of the results.

## 2022-08-14 NOTE — ED PROVIDER NOTE - CLINICAL SUMMARY MEDICAL DECISION MAKING FREE TEXT BOX
Flavio Pike MD, FACEP: In this physician's medical judgement based on clinical history and physical exam: patient with right gluteal abscess, will give analgesia prn, iv, cbc, cmp, ct a/p for evaluation of tracking of abscess and reassess. Will follow up on labs, analgesia, imaging, reassess and disposition as clinically indicated.  *The above represents an initial assessment/impression. Please refer to my progress notes below for potential changes in patient clinical course*   will consult surgery for abscess drainage

## 2022-08-14 NOTE — ED PROVIDER NOTE - SKIN WOUND DESCRIPTION
R buttock: +large ~4x4cm abscess to medial buttock with surrounding erythema and warmth, central fluctuance with induration extending to rectum, no drainage;  + mid R posterior thigh abscess, 1x1cm with central fluctuance, overlying erythema, no drainage R buttock: +large ~4x4cm abscess to medial buttock with surrounding erythema and warmth, central fluctuance, induration extending to rectum, no drainage;  + mid R posterior thigh abscess, 1x1cm with central fluctuance, overlying erythema, no drainage

## 2022-08-14 NOTE — CONSULT NOTE ADULT - ASSESSMENT
41y M h/o HIV on Biktarvy (undetectable [id number] per chart), HF s/p AICD, MRSA infection, C. Diff, presenting with R buttock abscess and R thigh abscess.    Plan:  - Abscesses drained at bedside, see procedure note  - Recommend discharge with augmentin  - F/u culture sent from R buttock abscess  - Recommend stool softeners, sitz baths while abscess I&D is healing  - Follow up with Dr. Montano after discharge    Red Team Surgery  [id number]

## 2022-08-14 NOTE — CONSULT NOTE ADULT - SUBJECTIVE AND OBJECTIVE BOX
GENERAL SURGERY CONSULT NOTE  Consulting surgical team: Red Team Surgery  Consulting attending: Dr. Montano    HPI:  41y M h/o HIV on Biktarvy (undetectable [id number] per chart), HF s/p AICD, MRSA infection, C. Diff, presenting with R buttock abscess and R thigh abscess. Has had abscesses in the past though he reports these have become much more painful. Had fevers at home though not in the ED.    Surgery consulted for the same.    PAST MEDICAL HISTORY:  CHF (congestive heart failure)  HIV (human immunodeficiency virus infection)  Depression  RUPA on CPAP  C. diff - many years ago on clindamycin while in [facility]    PAST SURGICAL HISTORY:  H/O hernia repair  AICD (automatic cardioverter/defibrillator) present    ALLERGIES:  No Known Allergies    VITALS & I/Os:  Vital Signs Last 24 Hrs  T(C): 36.8 (14 Aug 2022 17:12), Max: 36.8 (14 Aug 2022 17:12)  T(F): 98.2 (14 Aug 2022 17:12), Max: 98.2 (14 Aug 2022 17:12)  HR: 94 (14 Aug 2022 17:12) (94 - 94)  BP: 126/80 (14 Aug 2022 17:12) (126/80 - 126/80)  BP(mean): --  RR: 20 (14 Aug 2022 17:12) (20 - 20)  SpO2: 98% (14 Aug 2022 17:12) (98% - 98%)      PHYSICAL EXAM:  GEN: resting comfortably in bed, in NAD  CHEST: no increased WOB  : 2cm size fluctuance with surround erythema and fluctuance  NEURO: A&Ox3    LABS:                        15.1   17.32 )-----------( 232      ( 14 Aug 2022 19:54 )             45.8     08-14    140  |  99  |  8   ----------------------------<  91  3.5   |  29  |  0.99    Ca    8.9      14 Aug 2022 19:54    TPro  7.3  /  Alb  4.0  /  TBili  0.3  /  DBili  x   /  AST  13  /  ALT  12  /  AlkPhos  76  08-14    Lactate:                  IMAGING:   GENERAL SURGERY CONSULT NOTE  Consulting surgical team: Red Team Surgery  Consulting attending: Dr. Montano    HPI:  41y M h/o HIV on Biktarvy (undetectable [id number] per chart), HF s/p AICD, MRSA infection, C. Diff, presenting with R buttock abscess and R thigh abscess. Has had abscesses in the past though he reports these have become much more painful. Had fevers at home though not in the ED.    Surgery consulted for the same.    PAST MEDICAL HISTORY:  CHF (congestive heart failure)  HIV (human immunodeficiency virus infection)  Depression  RUPA on CPAP  C[facility]    PAST SURGICAL HISTORY:  H/O hernia repair  AICD (automatic cardioverter/defibrillator) present    ALLERGIES:  No Known Allergies    VITALS & I/Os:  Vital Signs Last 24 Hrs  T(C): 36.8 (14 Aug 2022 17:12), Max: 36.8 (14 Aug 2022 17:12)  T(F): 98.2 (14 Aug 2022 17:12), Max: 98.2 (14 Aug 2022 17:12)  HR: 94 (14 Aug 2022 17:12) (94 - 94)  BP: 126/80 (14 Aug 2022 17:12) (126/80 - 126/80)  BP(mean): --  RR: 20 (14 Aug 2022 17:12) (20 - 20)  SpO2: 98% (14 Aug 2022 17:12) (98% - 98%)      PHYSICAL EXAM:  GEN: resting comfortably in bed, in NAD  CHEST: no increased WOB  : 2cm size fluctuance with surround erythema and induration.  Ext: small 1cm size induration with low developing  NEURO: A&Ox3    LABS:                        15.1   17.32 )-----------( 232      ( 14 Aug 2022 19:54 )             45.8     08-14    140  |  99  |  8   ----------------------------<  91  3.5   |  29  |  0.99    Ca    8.9      14 Aug 2022 19:54    TPro  7.3  /  Alb  4.0  /  TBili  0.3  /  DBili  x   /  AST  13  /  ALT  12  /  AlkPhos  76  08-14    Lactate:                  IMAGING:

## 2022-08-14 NOTE — ED PROVIDER NOTE - PROGRESS NOTE DETAILS
surgery paged. - Nancy Garcia PA-C Surgery will see at bedside. surgery drained patient and will discuss with attending on antibiotics route, will discharge home with surgery wound check follow up. surgery drained patient and will discuss with attending on antibiotics route, will discharge home with surgery wound check follow up. recommendations for Augmentin will discharge on this antibiotics with surgery follow up

## 2022-08-14 NOTE — ED PROVIDER NOTE - NSFOLLOWUPINSTRUCTIONS_ED_ALL_ED_FT
Follow up our surgeons if you wish, you may call [phone] and arrange a follow up appointment.     Take your antibiotics until they are fully completed.    There were no signs of an emergency medical condition on completion of today's workup.  You will need further medical care and evaluation. A presumptive diagnosis of ***** has been made, however further evaluation may be required by your primary care doctor or specialist for a definitive diagnosis.      Follow up with your medical doctor in 2-3 days or call our clinic at [phone] and state you were seen in the Emergency Department and would like to be seen in clinic. You may also call [phone] to speak with a representative to assist follow up care with medicine, surgery, or specialists.    If you are having pain, take Tylenol/acetaminophen 1 g every six hours and supplement (if allowed by your physician, and if you're not having gastric/gastrointestinal/stomach/intestinal problems) with ibuprofen 600 mg, with food or milk/maalox, every six hours which can be taken three hours apart from the Tylenol to have a layered effect.     Be sure to take no more than 4000mg or 4g of Tylenol/acetaminophen in a 24 hour period. Be sure to check your other medications to see if they include Tylenol/acetaminophen and include them in your calculations to ensure you do not take more than 4000mg or 4g of Tylenol/acetaminophen a day.    Drink at least 2 Liters or 64 Ounces of water each day (UNLESS you are supposed to restrict fluids or have a history of congestive heart failure (CHF)).    Return for any persistent, worsening symptoms, or ANY concerns at all. Follow up our surgeons if you wish, you may call [phone] and arrange a follow up appointment.     Take your antibiotics until they are fully completed.    You should take stool softeners, sitz baths while abscess Incision and drainage (I&D) is healing    There were no signs of an emergency medical condition on completion of today's workup.  You will need further medical care and evaluation. A presumptive diagnosis of gluteal abscess has been made, however further evaluation may be required by your primary care doctor or specialist for a definitive diagnosis.      Follow up with your medical doctor in 2-3 days or call our clinic at [phone] and state you were seen in the Emergency Department and would like to be seen in clinic. You may also call [phone] to speak with a representative to assist follow up care with medicine, surgery, or specialists.    If you are having pain, take Tylenol/acetaminophen 1 g every six hours and supplement (if allowed by your physician, and if you're not having gastric/gastrointestinal/stomach/intestinal problems) with ibuprofen 600 mg, with food or milk/maalox, every six hours which can be taken three hours apart from the Tylenol to have a layered effect.     Be sure to take no more than 4000mg or 4g of Tylenol/acetaminophen in a 24 hour period. Be sure to check your other medications to see if they include Tylenol/acetaminophen and include them in your calculations to ensure you do not take more than 4000mg or 4g of Tylenol/acetaminophen a day.    Drink at least 2 Liters or 64 Ounces of water each day (UNLESS you are supposed to restrict fluids or have a history of congestive heart failure (CHF)).    Return for any persistent, worsening symptoms, or ANY concerns at all.

## 2022-08-14 NOTE — ED PROVIDER NOTE - NS ED ATTENDING STATEMENT MOD
This was a shared visit with the SHARI. I reviewed and verified the documentation and independently performed the documented:

## 2022-08-14 NOTE — ED ADULT NURSE NOTE - NSFALLRSKPASTHIST_ED_ALL_ED
She says she has a sinus infection, she has congestion, sinus pressure. She is wondering if you will call something in for it, I did let her know she may need a VV but she didn't want to sched. no

## 2022-08-14 NOTE — ED PROVIDER NOTE - ENDOCRINE NEGATIVE STATEMENT, MLM
Mr Mason called me today in regards to changing his appointment with Ana Self on 1/24 in [place].  He would instead prefer to come earlier as he has to be in [place] on this day at 1500 for his radiation tx.  I spoke with Boris in scheduling and have changed his appointment to 1/24/19 at 10:30am.  Additionally, he had some questions about having trouble with his PRO-CTCAE questionnaires over the phone.  I was able to find the [email or web address] number for him to call and I gave this to him.  I asked him to call me back if he still has any issues.    Kina Wilson  Research RN    
no diabetes and no thyroid trouble.

## 2022-08-14 NOTE — ED ADULT NURSE NOTE - OBJECTIVE STATEMENT
41 Y M presents to the ED with buttocks abscess and on L upper thigh, for 5 days, reports that he shaved and then got in the shower and immediately noticed that it was inflamed. On assessment, A&Ox4. Breathing spontaneously and unlabored on room air. Pt safety maintained. Call bell within reach. Side rails in upward position. Seen and evaluated by MD. Awaiting dispo.

## 2022-08-14 NOTE — ED PROVIDER NOTE - PATIENT PORTAL LINK FT
You can access the FollowMyHealth Patient Portal offered by [facility] by registering at the following website: [email or web address]. By joining [facility]’s FollowMyHealth portal, you will also be able to view your health information using other applications (apps) compatible with our system.

## 2022-08-14 NOTE — ED PROVIDER NOTE - NSICDXPASTSURGICALHX_GEN_ALL_CORE_FT
PAST SURGICAL HISTORY:  AICD (automatic cardioverter/defibrillator) present     H/O hernia repair

## 2022-08-14 NOTE — PROCEDURE NOTE - ADDITIONAL PROCEDURE DETAILS
R buttock abscess elliptical incision with approx 15cc pus drained    R posterior thigh abscess with 2cc pus drained via small punctate incision

## 2022-08-14 NOTE — ED PROVIDER NOTE - OBJECTIVE STATEMENT
40yo M with PMH of HIV on Biktarvy (undetectable VL x 8rs), heart failure s/p AICD, MRSA, C.diff, presenting with R buttock abscess and R thigh abscess x 4 days. Reports he has had abscesses in the past, applied warm compresses this time with no relief. Reports pain is worsening, hurts to sit, hurts to walk. Now with associated subjective fever/chills. Has not taken anything for pain today. Denies any numbness/tingling, n/v, drainage.

## 2022-08-14 NOTE — ED PROVIDER NOTE - CARE PROVIDER_API CALL
Paulino Montano)  ColonRectal Surgery; Surgery  [facility], [address] [place] [address]  Phone: [phone]  Fax: [phone]  Follow Up Time: 4-6 Days

## 2022-08-15 PROBLEM — Z00.00 ENCOUNTER FOR PREVENTIVE HEALTH EXAMINATION: Status: ACTIVE | Noted: 2022-08-15

## 2022-08-15 LAB
4/8 RATIO: 0.89 RATIO — LOW (ref 0.9–3.6)
ABS CD8: 833 /UL — HIGH (ref 142–740)
CD16+CD56+ CELLS NFR BLD: 6 % — SIGNIFICANT CHANGE UP (ref 5–23)
CD16+CD56+ CELLS NFR SPEC: 130 /UL — SIGNIFICANT CHANGE UP (ref 71–410)
CD19 BLASTS SPEC-ACNC: 12 % — SIGNIFICANT CHANGE UP (ref 6–24)
CD19 BLASTS SPEC-ACNC: 247 /UL — SIGNIFICANT CHANGE UP (ref 84–469)
CD3 BLASTS SPEC-ACNC: 1632 /UL — SIGNIFICANT CHANGE UP (ref 672–1870)
CD3 BLASTS SPEC-ACNC: 79 % — SIGNIFICANT CHANGE UP (ref 59–83)
CD4 %: 37 % — SIGNIFICANT CHANGE UP (ref 30–62)
CD8 %: 41 % — HIGH (ref 12–36)
T-CELL CD4 SUBSET PNL BLD: 745 /UL — SIGNIFICANT CHANGE UP (ref 489–1457)

## 2022-08-16 LAB
HIV-1 VIRAL LOAD RESULT: SIGNIFICANT CHANGE UP
HIV1 RNA # SERPL NAA+PROBE: SIGNIFICANT CHANGE UP COPIES/ML
HIV1 RNA SER-IMP: SIGNIFICANT CHANGE UP
HIV1 RNA SERPL NAA+PROBE-ACNC: SIGNIFICANT CHANGE UP
HIV1 RNA SERPL NAA+PROBE-LOG#: SIGNIFICANT CHANGE UP LG COP/ML

## 2022-08-17 LAB
CULTURE RESULTS: SIGNIFICANT CHANGE UP
SPECIMEN SOURCE: SIGNIFICANT CHANGE UP

## 2022-08-18 LAB
-  CLINDAMYCIN: SIGNIFICANT CHANGE UP
-  LEVOFLOXACIN: SIGNIFICANT CHANGE UP
-  PENICILLIN: SIGNIFICANT CHANGE UP
-  VANCOMYCIN: SIGNIFICANT CHANGE UP
CULTURE RESULTS: SIGNIFICANT CHANGE UP
METHOD TYPE: SIGNIFICANT CHANGE UP
ORGANISM # SPEC MICROSCOPIC CNT: SIGNIFICANT CHANGE UP
ORGANISM # SPEC MICROSCOPIC CNT: SIGNIFICANT CHANGE UP

## 2023-07-18 NOTE — ED PROVIDER NOTE - SKIN WOUND TYPE
From: Ayse Bernstein  To: Evaristo Anna  Sent: 7/17/2023 6:22 PM CDT  Subject: Presence Alexia Walker medication dose    Alexia has expressed feeling worse lately and asked if her medication dose can be increased. She is currently taking 10mg of Prozac once a day.    Thank you   abscess(s)

## 2024-09-13 NOTE — ED ADULT TRIAGE NOTE - AS O2 DELIVERY
The skin at the access site was anesthetized. A left axillary cut down was performed and surgical access was obtained.  using a INTRODUCER SHTH 9FR 13CM OPTISEAL. Left subclavian. Through previous surgical incision room air

## 2024-10-31 NOTE — ED PROVIDER NOTE - PROGRESS NOTE DETAILS
Ezio PAULINO: Pt offered I&D for small fluid detection on ultrasound, would like needle aspiration attempt, was unable to express purulent fluid, pt reports did spontaneously drain milky like discharge today. Pt agrees plan to continue abx. bilateral lower extremity ROM was WFL (within functional limits)

## 2024-11-12 ENCOUNTER — TRANSCRIPTION ENCOUNTER (OUTPATIENT)
Age: 44
End: 2024-11-12

## 2025-01-17 NOTE — ED ADULT NURSE NOTE - CADM POA CENTRAL LINE
[FreeTextEntry1] : blood work diabetes follow up [de-identified] : PMH: diabetes, post-ablative hypothyroidism, chronic LBP  1 - diabetes - metformin for 2 years. a1c's have always been less than 7 2- hypothyroidism - had hyperthyroidism, had ablation; now on levothyroxine 3- chronic LBP - MRI 2021 showing multilevel spondylosis; mild canal stenosis; takes gabapentin. Continues to have muscular low back pain 4- has had symptomatic palpitations - well-controlled with nadolol; no arrhythmias found on cardiac w/u 5- GERD - chronic, has seen GI in the past; had EGD in 2020 2/2 gastric perforation due to ingested chicken bone. Takes famotidine daily. Occasionally will need to take omeprazole in addition. Sometimes related to food intake.  Lives with daughter  passed away in [place] during COVID No